# Patient Record
(demographics unavailable — no encounter records)

---

## 2024-11-07 NOTE — ASSESSMENT
[FreeTextEntry1] : Patient is doing well and healing as expected Restrictions discussed with patient today. Restrictions include limiting upper extremity stretching or movements, no heavy lifting (> 10 pounds), no side sleeping for 3-4 weeks, no strenuous activities or exercise, no swimming Patient may shower Walking strongly encouraged Drains removed today without difficulties. Site was covered with gauze and tegaderm. Patient may shower normally and can remove waterproof bandage in 2 days. After 2 days, the site may be covered with a small amount of bacitracin and bandaid for an additional couple days after to ensure healing. monitor for redness, swelling, fever, chills no heavy lifting or strenuous activity continue to wear soft, non wire bra she is encouraged to call if there are any changes RTO in 2 weeks all pt questions answered

## 2024-11-07 NOTE — HISTORY OF PRESENT ILLNESS
[FreeTextEntry1] :  Ms. CAYDEN ROGEL is a 56 year  old female who presents with bilateral macromastia complaining of bilateral shoulder, neck, and back pain for many years. She is now s/p bilateral breast reduction   Doing well Drains: < 30 x 2 days

## 2024-11-07 NOTE — PHYSICAL EXAM
[NI] : Normal [de-identified] : bilateral breasts soft and symmetrical no signs of infection or palpable fluid collections noted nipples viable drains in place and functioning, drain sites without erythema, drainage serosanguinous  incisions c/d/i breast skin flaps with normal capillary refill and warm surgical tape and glue intact minimal swelling healing ecchymosis

## 2024-11-20 NOTE — HISTORY OF PRESENT ILLNESS
[FreeTextEntry1] :  Ms. CAYDEN ROGEL is a 56 year  old female who presents with bilateral macromastia complaining of bilateral shoulder, neck, and back pain for many years. She is now s/p bilateral breast reduction   Doing well Presents today for second post op

## 2024-11-20 NOTE — ASSESSMENT
[FreeTextEntry1] : Patient is doing well and healing as expected Restrictions discussed with patient today. Restrictions include limiting upper extremity stretching or movements, no heavy lifting (> 10 pounds), no side sleeping for 3-4 weeks, no strenuous activities or exercise, no swimming Patient may shower Walking strongly encouraged monitor for redness, swelling, fever, chills no heavy lifting or strenuous activity continue to wear soft, non wire bra she is encouraged to call if there are any changes RTO in 4 weeks for final post op appt  all pt questions answered

## 2024-11-20 NOTE — PHYSICAL EXAM
[NI] : Normal [de-identified] : bilateral breasts soft and symmetrical no signs of infection or palpable fluid collections noted nipples viable incisions c/d/i breast skin flaps with normal capillary refill and warm surgical tape and glue intact minimal swelling healing ecchymosis

## 2024-12-06 NOTE — ASSESSMENT
[FreeTextEntry1] : Patient is doing well and healing as expected  Restrictions discussed with patient today. Restrictions include limiting upper extremity stretching or movements, no heavy lifting (> 10 pounds), no side sleeping for 3-4 weeks, no strenuous activities or exercise, no swimming Patient may shower Walking strongly encouraged monitor for redness, swelling, fever, chills no heavy lifting or strenuous activity continue to wear soft, non wire bra she is encouraged to call if there are any changes RTO in 2 weeks all pt questions answered

## 2024-12-06 NOTE — PHYSICAL EXAM
[NI] : Normal [de-identified] : bilateral breasts soft and symmetrical no signs of infection or palpable fluid collections noted nipples viable incisions c/d/i breast skin flaps with normal capillary refill and warm surgical tape and glue intact minimal swelling healing ecchymosis  right breast at T point: small area of seroma drainage and dehiscence

## 2024-12-06 NOTE — PHYSICAL EXAM
[NI] : Normal [de-identified] : bilateral breasts soft and symmetrical no signs of infection or palpable fluid collections noted nipples viable incisions c/d/i breast skin flaps with normal capillary refill and warm surgical tape and glue intact minimal swelling healing ecchymosis  right breast at T point: small area of seroma drainage and dehiscence

## 2024-12-10 NOTE — ASSESSMENT
[FreeTextEntry1] : Patient is doing well and healing as expected apply medihoney to bilateral inferior T point dehiscence  All surgical tape and glue removed today with gentle adhesive remover wipes. Extra wipes given to patient to use following a shower if they continue to feel tacky from residual glue Discussed scar massages twice daily for patient to perform on their own using their preferred lotion or scar cream. Massage all incisions twice daily using firm pressure in a circular motion with lotion or in the shower. Perform massage for 5 minutes in the morning and 5 minutes in the evening for at least 6-8 weeks. Advised patient that if they will be in direct sunlight to use SPF 30 or higher over all of the healing incisions to prevent color changes. monitor for redness, swelling, fever, chills Patient without restrictions at this time and may proceed with their normal daily activities Patient may wear whatever bra they choose  she is encouraged to call if there are any changes RTO in 3 weeks for final post op  all pt questions answered

## 2024-12-10 NOTE — PHYSICAL EXAM
[NI] : Normal [de-identified] : bilateral breasts soft and symmetrical no signs of infection or palpable fluid collections noted nipples viable incisions c/d/i breast skin flaps with normal capillary refill and warm surgical tape and glue intact minimal swelling healing ecchymosis  right breast at T point: small area of seroma drainage and dehiscence

## 2024-12-10 NOTE — HISTORY OF PRESENT ILLNESS
[FreeTextEntry1] :  Ms. CAYDEN ROGEL is a 56 year  old female who presents with bilateral macromastia complaining of bilateral shoulder, neck, and back pain for many years. She is now s/p bilateral breast reduction   Doing well Presents today for post op visit. She has been complaining of "drainage" from the underside of her breast. It has gotten better but is still concerned.

## 2024-12-10 NOTE — PHYSICAL EXAM
[NI] : Normal [de-identified] : bilateral breasts soft and symmetrical no signs of infection or palpable fluid collections noted nipples viable incisions c/d/i breast skin flaps with normal capillary refill and warm surgical tape and glue intact minimal swelling healing ecchymosis  right breast at T point: small area of seroma drainage and dehiscence

## 2024-12-11 NOTE — PHYSICAL EXAM
[NI] : Normal [de-identified] : bilateral breasts soft and symmetrical no signs of infection or palpable fluid collections noted nipples viable incisions c/d/i breast skin flaps with normal capillary refill and warm surgical tape and glue intact minimal swelling healing ecchymosis  right breast at T point: small area of seroma drainage and dehiscence

## 2024-12-11 NOTE — PHYSICAL EXAM
[NI] : Normal [de-identified] : bilateral breasts soft and symmetrical no signs of infection or palpable fluid collections noted nipples viable incisions c/d/i breast skin flaps with normal capillary refill and warm surgical tape and glue intact minimal swelling healing ecchymosis  right breast at T point: small area of seroma drainage and dehiscence

## 2024-12-11 NOTE — ASSESSMENT
[FreeTextEntry1] : Patient is doing ok, she is 5 weeks post op   Restrictions discussed with patient today. Restrictions include limiting upper extremity stretching or movements, no heavy lifting (> 10 pounds), no side sleeping for 3-4 weeks, no strenuous activities or exercise, no swimming Patient may shower Walking strongly encouraged monitor for redness, swelling, fever, chills no heavy lifting or strenuous activity continue to wear soft, non wire bra she is encouraged to call if there are any changes RTO in 2 weeks for final post op  all pt questions answered

## 2024-12-30 NOTE — PHYSICAL EXAM
[NI] : Normal [de-identified] : bilateral breasts soft and symmetrical no signs of infection or palpable fluid collections noted nipples viable incisions c/d/i breast skin flaps with normal capillary refill and warm surgical tape and glue intact minimal swelling healing ecchymosis  right breast at T point: small area of seroma drainage and dehiscence

## 2024-12-30 NOTE — HISTORY OF PRESENT ILLNESS
[FreeTextEntry1] :  Ms. CAYDEN ROGEL is a 56 year  old female who presents with bilateral macromastia complaining of bilateral shoulder, neck, and back pain for many years. She is now s/p bilateral breast reduction   Doing well Presents today for post op visit and to discuss revision options.

## 2024-12-30 NOTE — ASSESSMENT
[FreeTextEntry1] : Patient is doing well and healing as expected Revision options discussed with patient today:   Discussed scar massages twice daily for patient to perform on their own using their preferred lotion or scar cream. Massage all incisions twice daily using firm pressure in a circular motion with lotion or in the shower. Perform massage for 5 minutes in the morning and 5 minutes in the evening for at least 6-8 weeks. Advised patient that if they will be in direct sunlight to use SPF 30 or higher over all of the healing incisions to prevent color changes. monitor for redness, swelling, fever, chills Patient without restrictions at this time and may proceed with their normal daily activities Patient may wear whatever bra they choose  she is encouraged to call if there are any changes RTO  all pt questions answered

## 2025-01-16 NOTE — PHYSICAL EXAM
[NI] : Normal [de-identified] : bilateral breasts soft and symmetrical no signs of infection or palpable fluid collections noted nipples viable incisions c/d/i breast skin flaps with normal capillary refill and warm surgical tape and glue intact minimal swelling

## 2025-01-16 NOTE — HISTORY OF PRESENT ILLNESS
[FreeTextEntry1] :  Ms. CAYDEN ROGEL is a 57 year old female who presented with bilateral macromastia complaining of bilateral shoulder, neck, and back pain for many years. She is now s/p bilateral breast reduction   Doing well Presents today for final post op visit and to discuss possible revision options.

## 2025-01-16 NOTE — ASSESSMENT
[FreeTextEntry1] : Patient is doing well and healing as expected seen and examined by Dr. Romeo Patient is interested in body contouring and liposuction of her abdomen, back and lateral breasts/trunk. Referral given to patient  Discussed scar massages twice daily for patient to perform on their own using their preferred lotion or scar cream. Massage all incisions twice daily using firm pressure in a circular motion with lotion or in the shower. Perform massage for 5 minutes in the morning and 5 minutes in the evening for at least 6-8 weeks. Advised patient that if they will be in direct sunlight to use SPF 30 or higher over all of the healing incisions to prevent color changes. monitor for redness, swelling, fever, chills Patient without restrictions at this time and may proceed with their normal daily activities Patient may wear whatever bra they choose  she is encouraged to call if there are any changes RTO prn all pt questions answered

## 2025-01-16 NOTE — PHYSICAL EXAM
[NI] : Normal [de-identified] : bilateral breasts soft and symmetrical no signs of infection or palpable fluid collections noted nipples viable incisions c/d/i breast skin flaps with normal capillary refill and warm surgical tape and glue intact minimal swelling

## 2025-07-25 NOTE — HISTORY OF PRESENT ILLNESS
[FreeTextEntry1] : JANIE ROGEL is a 57-year-old female presenting to the office today with a history of liposuction to her abdomen and [place], [place]. Patient is now complaining of being displeased with results. She did lymphatic massage for 2 weeks before coming back to the [place], but denies any improvement. Janie is inquiring about contour abnormalities.    PMHx- denied    PSHx- , Bilateral breast reduction (2024), liposuction of abdomen and back   Currently taking - denied    Allergies- NKDA   Never smoker

## 2025-07-25 NOTE — PHYSICAL EXAM
[de-identified] : NAD, AxOx3  [de-identified] : nonlabored breathing  [de-identified] : Residual swelling is still present  Well healed Pfannenstiel scar  No appreciable lipodystrophy  mild skin overhang   [de-identified] : no edema  [de-identified] : as above [de-identified] : grossly intatct [de-identified] : normal affect

## 2025-07-25 NOTE — PHYSICAL EXAM
[de-identified] : NAD, AxOx3  [de-identified] : nonlabored breathing  [de-identified] : Residual swelling is still present  Well healed Pfannenstiel scar  No appreciable lipodystrophy  mild skin overhang   [de-identified] : no edema  [de-identified] : as above [de-identified] : grossly intatct [de-identified] : normal affect

## 2025-07-25 NOTE — END OF VISIT
[FreeTextEntry3] : All medical record entries made by the Scribe were at my, Dr. Lauren Shikowitz-Behr, MD, direction and personally dictated by me on 07/22/2025. I have reviewed the chart and agree that the record accurately reflects my personal performance of the history, physical exam, assessment and plan. I have also personally directed, reviewed, and agreed with the chart.  [Time Spent: ___ minutes] : I have spent [unfilled] minutes of time on the encounter which excludes teaching and separately reported services.

## 2025-07-25 NOTE — REASON FOR VISIT
[Consultation] : a consultation visit [Family Member] : family member [FreeTextEntry1] : Dr. Ramirez Romeo

## 2025-07-25 NOTE — PHYSICAL EXAM
[de-identified] : NAD, AxOx3  [de-identified] : nonlabored breathing  [de-identified] : Residual swelling is still present  Well healed Pfannenstiel scar  No appreciable lipodystrophy  mild skin overhang   [de-identified] : no edema  [de-identified] : as above [de-identified] : grossly intatct [de-identified] : normal affect

## 2025-07-25 NOTE — ADDENDUM
[FreeTextEntry1] :  I, Artie Jang, documented this note as a scribe on behalf of Dr. Lauren Shikowitz-Behr, MD on 07/22/2025.